# Patient Record
Sex: FEMALE | Race: WHITE
[De-identification: names, ages, dates, MRNs, and addresses within clinical notes are randomized per-mention and may not be internally consistent; named-entity substitution may affect disease eponyms.]

---

## 2018-05-17 ENCOUNTER — HOSPITAL ENCOUNTER (OUTPATIENT)
Dept: HOSPITAL 47 - RADUSWWP | Age: 58
Discharge: HOME | End: 2018-05-17
Payer: COMMERCIAL

## 2018-05-17 DIAGNOSIS — E03.9: ICD-10-CM

## 2018-05-17 DIAGNOSIS — E03.4: Primary | ICD-10-CM

## 2018-05-17 PROCEDURE — 76536 US EXAM OF HEAD AND NECK: CPT

## 2018-05-18 NOTE — US
EXAMINATION TYPE: US thyroid st tissue head/neck

 

DATE OF EXAM: 5/17/2018

 

COMPARISON: NONE

 

CLINICAL HISTORY: E03.9 hypothyroidism. Pt on thyroid meds x 40+ years/ hypothyroid

 

GLAND SIZE:

 

Right Lobe: 3.7 x 0.8 x 1.5 cm

** Overall Parenchyma:  heterogenous

Left Lobe: 3.6 x 0.9 x 1.2 cm

** Overall Parenchyma:  heterogeneous

Isthmus Thickness:  0.2 cm

 

 

 

Bilateral neck scanned, no evidence of lymphadenopathy. Bilateral, echogenic, small, heterogeneous th
yroid. No evidence of nodules.

 

 

IMPRESSION:

Generalized thyroid atrophy with no focal measurable nodule.

## 2021-04-28 ENCOUNTER — HOSPITAL ENCOUNTER (OUTPATIENT)
Dept: HOSPITAL 47 - ORWHC2ENDO | Age: 61
Discharge: HOME | End: 2021-04-28
Attending: INTERNAL MEDICINE
Payer: COMMERCIAL

## 2021-04-28 VITALS — TEMPERATURE: 98 F

## 2021-04-28 VITALS — SYSTOLIC BLOOD PRESSURE: 123 MMHG | DIASTOLIC BLOOD PRESSURE: 80 MMHG | HEART RATE: 65 BPM

## 2021-04-28 VITALS — BODY MASS INDEX: 26.6 KG/M2

## 2021-04-28 VITALS — RESPIRATION RATE: 16 BRPM

## 2021-04-28 DIAGNOSIS — K29.50: ICD-10-CM

## 2021-04-28 DIAGNOSIS — Z88.1: ICD-10-CM

## 2021-04-28 DIAGNOSIS — Z79.899: ICD-10-CM

## 2021-04-28 DIAGNOSIS — K31.7: ICD-10-CM

## 2021-04-28 DIAGNOSIS — Z79.890: ICD-10-CM

## 2021-04-28 DIAGNOSIS — Z12.11: Primary | ICD-10-CM

## 2021-04-28 DIAGNOSIS — Z91.041: ICD-10-CM

## 2021-04-28 DIAGNOSIS — E07.9: ICD-10-CM

## 2021-04-28 DIAGNOSIS — Z80.0: ICD-10-CM

## 2021-04-28 PROCEDURE — 88342 IMHCHEM/IMCYTCHM 1ST ANTB: CPT

## 2021-04-28 PROCEDURE — 43239 EGD BIOPSY SINGLE/MULTIPLE: CPT

## 2021-04-28 PROCEDURE — 88305 TISSUE EXAM BY PATHOLOGIST: CPT

## 2021-04-28 PROCEDURE — 45378 DIAGNOSTIC COLONOSCOPY: CPT

## 2021-04-28 NOTE — P.PCN
Date of Procedure: 04/28/21


Procedure(s) Performed: 


Brief history:


Patient is a pleasant 60-year-old white female scheduled for an elective upper 

endoscopy as well as colonoscopy as a part of evaluation of GERD and screening 

for colon cancer.  Her father was diagnosed with colon cancer at age 65.





Procedure performed:


Esophagogastroduodenoscopy with biopsy


Colonoscopy





Preoperative diagnosis:


GERD


Screening for colon cancer/family history of colon cancer





Anesthesia: MAC





Procedure:


After informed consent was obtained from the patient  was brought into the 

endoscopy unit and IV  sedation was administered by anesthesia under continuous 

monitoring.  Initially upper endoscopy was done.  The Olympus  video 

endoscope was inserted inserted into the mouth and esophagus intubated without 

any difficulty and was gradually advanced into the stomach and duodenum and 

carefully examined.  The bulb and second part of the duodenum appeared normal.  

The scope was then withdrawn into the stomach adequately insufflated with air 

and upon careful examination  the antrum had mild gastritis and biopsies were 

done from this area. Several small gastric polyps noted in the body the stomach 

which was also biopsied.  Rest of the  body, cardia and fundus appeared normal. 

The scope was then withdrawn into the esophagus.  The GE junction was located at

40 cm to the incisors.  It appeared regular with no erythema erosions or 

ulcerations.  Rest of the esophagus appeared normal.  Patient tolerated the 

procedure well.





At this time the patient continued to remain sedation.  Initial digital rectal 

examination was normal.  Olympus  video colonoscope was then inserted into

the rectum and gradually advanced to the cecum without any difficulty.  Careful 

examination was performed as the scope was gradually being withdrawn.  The prep 

was excellent.  The cecum, ascending colon, transverse colon, descending colon, 

sigmoid colon and rectum appeared normal.  Retroflexion was performed in the 

rectum and no lesions were noted.  Patient tolerated the procedure well.





Impression:


1.  Upper Endoscopy revealed mild antral gastritis and small gastric polyps


2  Colonoscopy was within normal limits with no evidence of colitis or 

colorectal neoplasia








Recommendations:


Findings of this examination were discussed with the patient as well aher 

family.  She was advised to follow with the biopsy results.  She will continue 

with AcipHex 20 mg daily and follow antireflux measures.  She can have a repeat 

screening colonoscopy every 5 years because of the family history of colon 

cancer

## 2024-03-28 ENCOUNTER — HOSPITAL ENCOUNTER (OUTPATIENT)
Dept: HOSPITAL 47 - RADMRIMAIN | Age: 64
Discharge: HOME | End: 2024-03-28
Attending: FAMILY MEDICINE
Payer: COMMERCIAL

## 2024-03-28 DIAGNOSIS — G93.89: Primary | ICD-10-CM

## 2024-03-28 DIAGNOSIS — I67.82: ICD-10-CM

## 2024-03-28 PROCEDURE — 70553 MRI BRAIN STEM W/O & W/DYE: CPT

## 2024-03-28 NOTE — MR
EXAMINATION TYPE: MR brain and iac wo/w con

 

DATE OF EXAM: 3/28/2024

 

COMPARISON: None

 

HISTORY: Dizziness

 

TECHNIQUE: 

Multiplanar, multisequence images of the brain and brainstem is performed without and with IV contras
t, utilizing 7.5 mL intravenous Gadavist .

 

FINDINGS: Diffusion weighted images demonstrate no evidence of a recent infarct or other diffusion ab
normality.

 

Mild generalized degenerative change. There couple punctate areas of abnormal signal scattered throug
hout the white matter bilaterally which is nonspecific. Most likely in the basis of hypertension, or 
remote microvascular ischemia.

 

There is no evidence of cerebellopontine angle mass or acoustic schwannoma. Changes of mild right chr
onic mastoiditis.

 

Midline structures demonstrate normal morphology.  The craniocervical junction appears within normal 
limits.  Post contrast images demonstrate no abnormal enhancement. The dural venous sinuses appear pa
tent. Mild changes of chronic sinusitis. Orbits are symmetric.

 

 

IMPRESSION: 

1. No evidence of cerebellopontine angle mass or acoustic schwannoma.

2. Minimal nonspecific white matter changes most typical of remote microvascular ischemia.

3. Correlate for chronic right mastoiditis.

## 2024-07-16 ENCOUNTER — HOSPITAL ENCOUNTER (EMERGENCY)
Dept: HOSPITAL 47 - EC | Age: 64
LOS: 1 days | Discharge: HOME | End: 2024-07-17
Payer: COMMERCIAL

## 2024-07-16 VITALS — RESPIRATION RATE: 18 BRPM

## 2024-07-16 DIAGNOSIS — M54.2: Primary | ICD-10-CM

## 2024-07-16 DIAGNOSIS — Z88.1: ICD-10-CM

## 2024-07-16 DIAGNOSIS — Z88.8: ICD-10-CM

## 2024-07-16 DIAGNOSIS — R07.89: ICD-10-CM

## 2024-07-16 DIAGNOSIS — Z91.041: ICD-10-CM

## 2024-07-16 PROCEDURE — 71046 X-RAY EXAM CHEST 2 VIEWS: CPT

## 2024-07-16 PROCEDURE — 93005 ELECTROCARDIOGRAM TRACING: CPT

## 2024-07-16 PROCEDURE — 96361 HYDRATE IV INFUSION ADD-ON: CPT

## 2024-07-16 PROCEDURE — 80053 COMPREHEN METABOLIC PANEL: CPT

## 2024-07-16 PROCEDURE — 72125 CT NECK SPINE W/O DYE: CPT

## 2024-07-16 PROCEDURE — 85730 THROMBOPLASTIN TIME PARTIAL: CPT

## 2024-07-16 PROCEDURE — 84484 ASSAY OF TROPONIN QUANT: CPT

## 2024-07-16 PROCEDURE — 96374 THER/PROPH/DIAG INJ IV PUSH: CPT

## 2024-07-16 PROCEDURE — 85025 COMPLETE CBC W/AUTO DIFF WBC: CPT

## 2024-07-16 PROCEDURE — 85610 PROTHROMBIN TIME: CPT

## 2024-07-16 PROCEDURE — 83735 ASSAY OF MAGNESIUM: CPT

## 2024-07-16 PROCEDURE — 36415 COLL VENOUS BLD VENIPUNCTURE: CPT

## 2024-07-16 PROCEDURE — 99284 EMERGENCY DEPT VISIT MOD MDM: CPT

## 2024-07-17 VITALS — DIASTOLIC BLOOD PRESSURE: 91 MMHG | HEART RATE: 69 BPM | SYSTOLIC BLOOD PRESSURE: 173 MMHG | TEMPERATURE: 97.8 F

## 2024-07-17 LAB
ALBUMIN SERPL-MCNC: 4.7 G/DL (ref 3.5–5)
ALP SERPL-CCNC: 104 U/L (ref 38–126)
ALT SERPL-CCNC: 20 U/L (ref 4–34)
ANION GAP SERPL CALC-SCNC: 12 MMOL/L
APTT BLD: 25.9 SEC (ref 22–30)
AST SERPL-CCNC: 23 U/L (ref 14–36)
BASOPHILS # BLD AUTO: 0 K/UL (ref 0–0.2)
BASOPHILS NFR BLD AUTO: 0 %
BUN SERPL-SCNC: 15 MG/DL (ref 7–17)
CALCIUM SPEC-MCNC: 9.7 MG/DL (ref 8.4–10.2)
CHLORIDE SERPL-SCNC: 106 MMOL/L (ref 98–107)
CO2 SERPL-SCNC: 21 MMOL/L (ref 22–30)
EOSINOPHIL # BLD AUTO: 0.1 K/UL (ref 0–0.7)
EOSINOPHIL NFR BLD AUTO: 0 %
ERYTHROCYTE [DISTWIDTH] IN BLOOD BY AUTOMATED COUNT: 4.62 M/UL (ref 3.8–5.4)
ERYTHROCYTE [DISTWIDTH] IN BLOOD: 12.3 % (ref 11.5–15.5)
GLUCOSE SERPL-MCNC: 149 MG/DL (ref 74–99)
HCT VFR BLD AUTO: 44.5 % (ref 34–46)
HGB BLD-MCNC: 14.3 GM/DL (ref 11.4–16)
INR PPP: 1 (ref ?–1.2)
LYMPHOCYTES # SPEC AUTO: 1.8 K/UL (ref 1–4.8)
LYMPHOCYTES NFR SPEC AUTO: 14 %
MAGNESIUM SPEC-SCNC: 2.1 MG/DL (ref 1.6–2.3)
MCH RBC QN AUTO: 30.9 PG (ref 25–35)
MCHC RBC AUTO-ENTMCNC: 32.1 G/DL (ref 31–37)
MCV RBC AUTO: 96.3 FL (ref 80–100)
MONOCYTES # BLD AUTO: 0.7 K/UL (ref 0–1)
MONOCYTES NFR BLD AUTO: 5 %
NEUTROPHILS # BLD AUTO: 10.1 K/UL (ref 1.3–7.7)
NEUTROPHILS NFR BLD AUTO: 79 %
PLATELET # BLD AUTO: 252 K/UL (ref 150–450)
POTASSIUM SERPL-SCNC: 3.8 MMOL/L (ref 3.5–5.1)
PROT SERPL-MCNC: 7.5 G/DL (ref 6.3–8.2)
PT BLD: 10.6 SEC (ref 10–12.5)
SODIUM SERPL-SCNC: 139 MMOL/L (ref 137–145)
WBC # BLD AUTO: 12.8 K/UL (ref 3.8–10.6)

## 2024-07-17 RX ADMIN — CEFAZOLIN STA MLS/HR: 330 INJECTION, POWDER, FOR SOLUTION INTRAMUSCULAR; INTRAVENOUS at 01:28

## 2024-07-17 RX ADMIN — KETOROLAC TROMETHAMINE STA MG: 15 INJECTION, SOLUTION INTRAMUSCULAR; INTRAVENOUS at 02:13

## 2024-07-17 NOTE — CT
EXAMINATION TYPE: CT cervical spine wo con

 

DATE OF EXAM: 7/17/2024

 

COMPARISON: NONE

 

HISTORY: neck pain for one day and spasms

 

CT DLP: 363.2 mGycm.  Automated Exposure Control for Dose Reduction was Utilized.

 

TECHNIQUE:  CT scan of the cervical spine is obtained without contrast, axial images are obtained, sa
gittal and coronal reformatted images are also reviewed.

 

FINDINGS: Cervical spine is visualized in its entirety from C1 through upper thoracic levels, demonst
rates scoliotic curvature without evidence of acute fracture or dislocation.  Prevertebral soft tissu
e appears within normal limits.  The C1-C2 articulation is within normal limits on the coronal images
. Vertebral body heights and disc space heights are fairly well maintained. Spinal canal is grossly p
reserved

 

Review of axial images shows no significant spinal canal stenosis or neural foraminal narrowing at an
y cervical level. Thyroid gland is hypoplastic and/or surgically absent. Visualized lung apices are c
lear without pneumothorax seen. 

 

IMPRESSION: There is no acute findings evident in the cervical spine.  MRI noted more sensitive.

## 2024-07-17 NOTE — XR
EXAMINATION TYPE: XR chest 2V

 

DATE OF EXAM: 7/17/2024

 

COMPARISON: NONE

 

HISTORY: Chest pain

 

TECHNIQUE:  Frontal and lateral views of the chest are obtained.

 

FINDINGS:  Focal left basilar linear scarring and/or atelectasis. Right lung is clear. The cardiac si
lhouette size is within normal limits.   The osseous structures are intact.

 

IMPRESSION:  Left basilar linear scarring and/or atelectasis.

## 2024-07-17 NOTE — ED
Neck Injury/Pain HPI





- General


Chief Complaint: Neck Pain/Injury


Stated Complaint: Muscle spams in chest and in back of neck.


Time Seen by Provider: 07/17/24 00:15


Source: patient, RN notes reviewed


Mode of arrival: ambulatory


Limitations: no limitations





- History of Present Illness


Initial Comments: 





64 year old female presenting to the ER with a chief complaint of neck pain and 

chest pain.  Patient states she is currently in physical therapy for vertigo and

neck spasms.  She states today she had a physical therapy session with 

adjustments.  States since then she has been experiencing a tightness in 

bilateral upper chest.  She states it is not painful to touch or movement of 

arms.  She does report she had an extensive cardiac workup recently with stress 

test and echo which was negative.  She denies any shortness of breath, 

dizziness, nausea, vomiting or peripheral edema.  She does states she has been 

taking prescribed muscle relaxers and over-the-counter Advil with minor relief. 

Denies any fevers or chills.





- Related Data


                                Home Medications











 Medication  Instructions  Recorded  Confirmed


 


Fluticasone Nasal Spray [Flonase 2 spr EA NOSTRIL DAILY 04/18/16 04/26/21





Nasal Spray]   


 


RABEprazole SODIUM [Aciphex] 20 mg PO DAILY 04/18/16 04/26/21


 


Levothyroxine Sodium 112 mcg PO QAM 10/16/20 04/26/21


 


ALPRAZolam [Xanax] 0.25 mg PO DAILY PRN 04/26/21 04/26/21


 


Fexofenadine HCl [Allegra Allergy] 180 mg PO DAILY 04/26/21 04/26/21


 


Baclofen [Lioresal] 1 PO HS 07/17/24 











                                    Allergies











Allergy/AdvReac Type Severity Reaction Status Date / Time


 


bacitracin Allergy  "wound did Verified 07/16/24 23:05





[From Neosporin   not heal"  





([id number])]     


 


bacitracin zinc Allergy  "wound did Verified 07/16/24 23:05





[From Neosporin   not heal"  





([id number])]     


 


cefuroxime axetil Allergy  Rash/Hives Verified 07/16/24 23:05





[From Ceftin]     


 


Iodinated Contrast Media Allergy  Itching,elmer Verified 07/16/24 23:05





[Iodinated Contrast Media -   h  





IV Dye]     


 


neomycin sulfate Allergy  "wound did Verified 07/16/24 23:05





[From Neosporin   not heal"  





([id number])]     


 


polymyxin B Allergy  "wound did Verified 07/16/24 23:05





[From Neosporin   not heal"  





([id number])]     














Review of Systems


ROS Statement: 


Those systems with pertinent positive or pertinent negative responses have been 

documented in the HPI.





ROS Other: All systems not noted in ROS Statement are negative.





Past Medical History


Past Medical History: Diabetes Mellitus, Thyroid Disorder


Additional Past Medical History / Comment(s): Seasonal allergies, hx of H 

Pylori.


History of Any Multi-Drug Resistant Organisms: None Reported


Additional Past Surgical History / Comment(s): EGD, colonoscopy.


Past Anesthesia/Blood Transfusion Reactions: No Reported Reaction


Past Psychological History: Anxiety


Smoking Status: Never smoker


Past Alcohol Use History: Occasional


Past Drug Use History: None Reported





- Past Family History


  ** Father


Family Medical History: Cancer


Additional Family Medical History / Comment(s): colon





General Exam


Limitations: no limitations


General appearance: alert, in no apparent distress


Neck exam: Present: normal inspection.  Absent: tenderness, meningismus, 

lymphadenopathy


Respiratory exam: Present: normal lung sounds bilaterally.  Absent: respiratory 

distress, wheezes, rales, rhonchi, stridor


Cardiovascular Exam: Present: regular rate, normal rhythm, normal heart sounds. 

Absent: systolic murmur, diastolic murmur, rubs, gallop, clicks


Extremities exam: Present: normal inspection, full ROM, normal capillary refill.

 Absent: tenderness, pedal edema, joint swelling, calf tenderness


Skin exam: Present: warm, dry, intact, normal color.  Absent: rash





Course


                                   Vital Signs











  07/16/24 07/17/24





  23:06 03:12


 


Temperature 97.5 F L 97.8 F


 


Pulse Rate 99 69


 


Respiratory 18 18





Rate  


 


Blood Pressure 169/98 173/91


 


O2 Sat by Pulse 100 100





Oximetry  














Medical Decision Making





- Medical Decision Making


Was pt. sent in by a medical professional or institution (, PA, NP, urgent c

are, hospital, or nursing home...) When possible be specific


@  -No


Did you speak to anyone other than the patient for history (EMS, parent, family,

police, friend...)? What history was obtained from this source 


@  -No


Did you review nursing and triage notes (agree or disagree)?  Why? 


@  -I reviewed and agree with nursing and triage notes


Were old charts reviewed (outside hosp., previous admission, EMS record, old 

EKG, old radiological studies, urgent care reports/EKG's, nursing home records)?

Report findings 


@  -No old charts were reviewed


Differential Diagnosis (chest pain, altered mental status, abdominal pain women,

abdominal pain men, vaginal bleeding, weakness, fever, dyspnea, syncope, 

headache, dizziness, GI bleed, back pain, seizure, CVA, palpatations, mental 

health, musculoskeletal)? 


@  -Differential Chest Pain:Stable Angina, Unstable Angina, STEMI, NSTEMI Aortic

Dissection, Pneumothorax, Musculoskeletal, Esophageal Spasm GERD, Cholecystitis,

Pancreatitis, Zoster, this is not meant to be an all-inclusive list. 


EKG interpreted by me (3pts min.).


@  -As above


X-rays interpreted by me (1pt min.).


@  -[Chest t x-ray interpreted by me negative for acute cardiopulmonary process.


CT interpreted by me (1pt min.).


@  -CT cervical spine negative for acute findings.


U/S interpreted by me (1pt. min.).


@  -[None done


What testing was considered but not performed or refused? (CT, X-rays, U/S, 

labs)? Why?


@  -None


What meds were considered but not given or refused? Why?


@  -None


Did you discuss the management of the patient with other professionals 

(professionals i.e. , PA, NP, lab, RT, psych nurse, , , 

teacher, , )? Give summary


@  -No


Was smoking cessation discussed for >3mins.?


@  -No


Was critical care preformed (if so, how long)?


@  -No


Were there social determinants of health that impacted care today? How? 

(Homelessness, low income, unemployed, alcoholism, drug addiction, 

transportation, low edu. Level, literacy, decrease access to med. care, retirement, 

rehab)?


@  -No


Was there de-escalation of care discussed even if they declined (Discuss DNR or 

withdrawal of care, Hospice)? DNR status


@  -No


What co-morbidities impacted this encounter? (DM, HTN, Smoking, COPD, CAD, 

Cancer, CVA, ARF, Chemo, Hep., AIDS, mental health diagnosis, sleep apnea, 

morbid obesity)?


@  -None


Was patient admitted / discharged? Hospital course, mention meds given and 

route, prescriptions, significant lab abnormalities, going to OR and other 

pertinent info.


@  -Discharge.  64-year-old female presented to the ER with a chief complaint of

 neck and chest pain.  History and physical exam completed.  Vitals stable.  

Patient in no signs of acute distress and nontoxic-appearing.  Exam 

unremarkable.  Pain is not reproducible to palpation or movement.  Cardiac 

workup will be obtained.  Laboratory studies unimpressive.  Troponin undetectabl

e.  Due to patient's recent extensive outpatient cardiac workup patient stable 

for discharge. HEART score 3. Chest x-ray negative for acute process.  CT 

cervical spine negative.  Patient received IV Toradol and p.o. Valium for 

symptom control in the ER.  Upon reevaluation, patient resting comfortably in 

exam room in no signs of acute distress.  Patient reporting improvement of 

symptoms.  Patient stable for condition at this time.  Results discussed with 

patient, all questions answered.  Advised close follow-up with PCP.  Strict 

return parameters discussed.  Patient discharged in stable condition.  Patient 

verbally expressed understanding and agreement with care plan.  Case discussed 

with ED attending, Dr. Carter.


Undiagnosed new problem with uncertain prognosis?


@  -No


Drug Therapy requiring intensive monitoring for toxicity (Heparin, Nitro, 

Insulin, Cardizem)?


@  -No


Were any procedures done?


@  -No


Diagnosis/symptom?


@  -Musculoskeletal pain


Acute, or Chronic, or Acute on Chronic?


@  -Acute


Uncomplicated (without systemic symptoms) or Complicated (systemic symptoms)?


@  -Uncomplicated


Side effects of treatment?


@  -No


Exacerbation, Progression, or Severe Exacerbation?


@  -No


Poses a threat to life or bodily function? How? (Chest pain, USA, MI, pneumonia,

 PE, COPD, DKA, ARF, appy, cholecystitis, CVA, Diverticulitis, Homicidal, 

Suicidal, threat to staff... and all critical care pts)


@  -No








- Lab Data


Result diagrams: 


                                 07/17/24 01:09





                                 07/17/24 01:09


                                   Lab Results











  07/17/24 07/17/24 07/17/24 Range/Units





  01:09 01:09 01:09 


 


WBC  12.8 H    (3.8-10.6)  k/uL


 


RBC  4.62    (3.80-5.40)  m/uL


 


Hgb  14.3    (11.4-16.0)  gm/dL


 


Hct  44.5    (34.0-46.0)  %


 


MCV  96.3    (80.0-100.0)  fL


 


MCH  30.9    (25.0-35.0)  pg


 


MCHC  32.1    (31.0-37.0)  g/dL


 


RDW  12.3    (11.5-15.5)  %


 


Plt Count  252    (150-450)  k/uL


 


MPV  7.4    


 


Neutrophils %  79    %


 


Lymphocytes %  14    %


 


Monocytes %  5    %


 


Eosinophils %  0    %


 


Basophils %  0    %


 


Neutrophils #  10.1 H    (1.3-7.7)  k/uL


 


Lymphocytes #  1.8    (1.0-4.8)  k/uL


 


Monocytes #  0.7    (0-1.0)  k/uL


 


Eosinophils #  0.1    (0-0.7)  k/uL


 


Basophils #  0.0    (0-0.2)  k/uL


 


PT   10.6   (10.0-12.5)  sec


 


INR   1.0   (<1.2)  


 


APTT   25.9   (22.0-30.0)  sec


 


Sodium    139  (137-145)  mmol/L


 


Potassium    3.8  (3.5-5.1)  mmol/L


 


Chloride    106  ()  mmol/L


 


Carbon Dioxide    21 L  (22-30)  mmol/L


 


Anion Gap    12  mmol/L


 


BUN    15  (7-17)  mg/dL


 


Creatinine    0.61  (0.52-1.04)  mg/dL


 


Est GFR (CKD-EPI)AfAm    >90  (>60 ml/min/1.73 sqM)  


 


Est GFR (CKD-EPI)NonAf    >90  (>60 ml/min/1.73 sqM)  


 


Glucose    149 H  (74-99)  mg/dL


 


Calcium    9.7  (8.4-10.2)  mg/dL


 


Magnesium    2.1  (1.6-2.3)  mg/dL


 


Total Bilirubin    0.7  (0.2-1.3)  mg/dL


 


AST    23  (14-36)  U/L


 


ALT    20  (4-34)  U/L


 


Alkaline Phosphatase    104  ()  U/L


 


Troponin I     (0.000-0.034)  ng/mL


 


Total Protein    7.5  (6.3-8.2)  g/dL


 


Albumin    4.7  (3.5-5.0)  g/dL














  07/17/24 Range/Units





  01:09 


 


WBC   (3.8-10.6)  k/uL


 


RBC   (3.80-5.40)  m/uL


 


Hgb   (11.4-16.0)  gm/dL


 


Hct   (34.0-46.0)  %


 


MCV   (80.0-100.0)  fL


 


MCH   (25.0-35.0)  pg


 


MCHC   (31.0-37.0)  g/dL


 


RDW   (11.5-15.5)  %


 


Plt Count   (150-450)  k/uL


 


MPV   


 


Neutrophils %   %


 


Lymphocytes %   %


 


Monocytes %   %


 


Eosinophils %   %


 


Basophils %   %


 


Neutrophils #   (1.3-7.7)  k/uL


 


Lymphocytes #   (1.0-4.8)  k/uL


 


Monocytes #   (0-1.0)  k/uL


 


Eosinophils #   (0-0.7)  k/uL


 


Basophils #   (0-0.2)  k/uL


 


PT   (10.0-12.5)  sec


 


INR   (<1.2)  


 


APTT   (22.0-30.0)  sec


 


Sodium   (137-145)  mmol/L


 


Potassium   (3.5-5.1)  mmol/L


 


Chloride   ()  mmol/L


 


Carbon Dioxide   (22-30)  mmol/L


 


Anion Gap   mmol/L


 


BUN   (7-17)  mg/dL


 


Creatinine   (0.52-1.04)  mg/dL


 


Est GFR (CKD-EPI)AfAm   (>60 ml/min/1.73 sqM)  


 


Est GFR (CKD-EPI)NonAf   (>60 ml/min/1.73 sqM)  


 


Glucose   (74-99)  mg/dL


 


Calcium   (8.4-10.2)  mg/dL


 


Magnesium   (1.6-2.3)  mg/dL


 


Total Bilirubin   (0.2-1.3)  mg/dL


 


AST   (14-36)  U/L


 


ALT   (4-34)  U/L


 


Alkaline Phosphatase   ()  U/L


 


Troponin I  <0.012  (0.000-0.034)  ng/mL


 


Total Protein   (6.3-8.2)  g/dL


 


Albumin   (3.5-5.0)  g/dL














- EKG Data


-: EKG Interpreted by Me


EKG Comments: 





EKG taken at 0: 34 showing a sinus rhythm with no acute ST segment or T wave 

abnormalities.  Normal axis.  Ventricular rate 82, UT interval 145, QRS duration

 89, QT/QTc 378/417.





- Radiology Data


Radiology results: report reviewed, image reviewed





Disposition


Clinical Impression: 


 Musculoskeletal pain





Disposition: HOME SELF-CARE


Condition: Stable


Instructions (If sedation given, give patient instructions):  Musculoskeletal 

Pain (ED)


Additional Instructions: 


Continue taking muscle relaxers as prescribed.  I recommend over-the-counter 

Tylenol and Motrin for pain control.  Please follow-up with primary care 

physician.  Return to the ER for any new or worsening concerns.


Is patient prescribed a controlled substance at d/c from ED?: No


Referrals: 


Bryson Red MD [Primary Care Provider] - 1-2 days


Time of Disposition: 02:54